# Patient Record
Sex: MALE | Race: WHITE | Employment: FULL TIME | URBAN - METROPOLITAN AREA
[De-identification: names, ages, dates, MRNs, and addresses within clinical notes are randomized per-mention and may not be internally consistent; named-entity substitution may affect disease eponyms.]

---

## 2019-09-23 ENCOUNTER — OFFICE VISIT (OUTPATIENT)
Dept: FAMILY MEDICINE CLINIC | Age: 34
End: 2019-09-23
Payer: COMMERCIAL

## 2019-09-23 VITALS
WEIGHT: 246.6 LBS | HEIGHT: 72 IN | HEART RATE: 79 BPM | DIASTOLIC BLOOD PRESSURE: 110 MMHG | OXYGEN SATURATION: 97 % | BODY MASS INDEX: 33.4 KG/M2 | SYSTOLIC BLOOD PRESSURE: 178 MMHG

## 2019-09-23 DIAGNOSIS — Z00.00 ENCOUNTER FOR MEDICAL EXAMINATION TO ESTABLISH CARE: Primary | ICD-10-CM

## 2019-09-23 DIAGNOSIS — I10 ESSENTIAL HYPERTENSION: ICD-10-CM

## 2019-09-23 DIAGNOSIS — R45.89 DEPRESSED MOOD: ICD-10-CM

## 2019-09-23 PROCEDURE — 99204 OFFICE O/P NEW MOD 45 MIN: CPT | Performed by: FAMILY MEDICINE

## 2019-09-23 RX ORDER — CHLORTHALIDONE 25 MG/1
25 TABLET ORAL DAILY
Qty: 30 TABLET | Refills: 3 | Status: SHIPPED | OUTPATIENT
Start: 2019-09-23 | End: 2019-10-31 | Stop reason: SDUPTHER

## 2019-09-23 ASSESSMENT — ENCOUNTER SYMPTOMS
PHOTOPHOBIA: 0
DIARRHEA: 0
COUGH: 0
VOMITING: 0
CONSTIPATION: 0
SHORTNESS OF BREATH: 0
BLOOD IN STOOL: 0
BACK PAIN: 0
TROUBLE SWALLOWING: 0
NAUSEA: 0
CHEST TIGHTNESS: 0
RHINORRHEA: 0
ABDOMINAL PAIN: 0
SORE THROAT: 0

## 2019-09-23 ASSESSMENT — PATIENT HEALTH QUESTIONNAIRE - PHQ9
SUM OF ALL RESPONSES TO PHQ9 QUESTIONS 1 & 2: 2
SUM OF ALL RESPONSES TO PHQ QUESTIONS 1-9: 2
2. FEELING DOWN, DEPRESSED OR HOPELESS: 0
SUM OF ALL RESPONSES TO PHQ QUESTIONS 1-9: 2
1. LITTLE INTEREST OR PLEASURE IN DOING THINGS: 2

## 2019-09-23 NOTE — PROGRESS NOTES
Substance and Sexual Activity   Sexual Activity Not on file          Patient's past medical history, surgical history, family history, medications,  andallergies  were all reviewed and updated as appropriate today. Review of Systems   Constitutional: Negative for chills, fatigue and fever. HENT: Negative for hearing loss, rhinorrhea, sore throat and trouble swallowing. Eyes: Negative for photophobia and visual disturbance. Respiratory: Negative for cough, chest tightness and shortness of breath. Cardiovascular: Negative for chest pain, palpitations and leg swelling. Gastrointestinal: Negative for abdominal pain, blood in stool, constipation, diarrhea, nausea and vomiting. Endocrine: Negative for polydipsia, polyphagia and polyuria. Genitourinary: Negative for difficulty urinating, dysuria and frequency. Musculoskeletal: Negative for arthralgias, back pain and myalgias. Skin: Negative for rash and wound. Neurological: Positive for headaches. Negative for dizziness, syncope, weakness, light-headedness and numbness. Hematological: Negative for adenopathy. Does not bruise/bleed easily. Psychiatric/Behavioral: Positive for dysphoric mood and sleep disturbance. Negative for behavioral problems, confusion, decreased concentration, hallucinations, self-injury and suicidal ideas. The patient is not nervous/anxious and is not hyperactive. Vitals:    09/23/19 0942   BP: (!) 178/110   Site: Left Upper Arm   Position: Sitting   Pulse: 79   SpO2: 97%   Weight: 246 lb 9.6 oz (111.9 kg)   Height: 5' 11.5\" (1.816 m)       Physical Exam   Constitutional: He is oriented to person, place, and time. He appears well-developed and well-nourished. No distress. HENT:   Head: Normocephalic and atraumatic.    Right Ear: Hearing, tympanic membrane, external ear and ear canal normal.   Left Ear: Hearing, tympanic membrane, external ear and ear canal normal.   Mouth/Throat: Oropharynx is clear and

## 2019-09-23 NOTE — PATIENT INSTRUCTIONS
It was nice meeting you today -- welcome to our office at [address] [place] [address].   We're happy to have you as a new patient    Start taking chlorthalidone once daily for blood pressure    Check your blood pressure at home and write down the numbers in a log to bring in to your next appointment    We'll get you set up with an appointment for therapy with our psychologist

## 2019-10-31 ENCOUNTER — OFFICE VISIT (OUTPATIENT)
Dept: FAMILY MEDICINE CLINIC | Age: 34
End: 2019-10-31
Payer: COMMERCIAL

## 2019-10-31 VITALS
OXYGEN SATURATION: 97 % | SYSTOLIC BLOOD PRESSURE: 158 MMHG | HEART RATE: 70 BPM | BODY MASS INDEX: 32.62 KG/M2 | WEIGHT: 237.2 LBS | DIASTOLIC BLOOD PRESSURE: 98 MMHG

## 2019-10-31 DIAGNOSIS — I10 ESSENTIAL HYPERTENSION: Primary | ICD-10-CM

## 2019-10-31 PROCEDURE — 99213 OFFICE O/P EST LOW 20 MIN: CPT | Performed by: FAMILY MEDICINE

## 2019-10-31 RX ORDER — CHLORTHALIDONE 50 MG/1
50 TABLET ORAL DAILY
Qty: 30 TABLET | Refills: 1 | Status: SHIPPED | OUTPATIENT
Start: 2019-10-31 | End: 2020-01-06

## 2019-10-31 SDOH — HEALTH STABILITY: PHYSICAL HEALTH: ON AVERAGE, HOW MANY DAYS PER WEEK DO YOU ENGAGE IN MODERATE TO STRENUOUS EXERCISE (LIKE A BRISK WALK)?: 4 DAYS

## 2019-10-31 SDOH — HEALTH STABILITY: PHYSICAL HEALTH: ON AVERAGE, HOW MANY MINUTES DO YOU ENGAGE IN EXERCISE AT THIS LEVEL?: 60 MIN

## 2019-10-31 ASSESSMENT — ENCOUNTER SYMPTOMS
VOMITING: 0
CONSTIPATION: 0
DIARRHEA: 0
SHORTNESS OF BREATH: 0
PHOTOPHOBIA: 0
CHEST TIGHTNESS: 0
COUGH: 0
BLURRED VISION: 0
ORTHOPNEA: 0
ABDOMINAL PAIN: 0
NAUSEA: 0

## 2020-01-06 RX ORDER — CHLORTHALIDONE 50 MG/1
TABLET ORAL
Qty: 30 TABLET | Refills: 1 | Status: SHIPPED | OUTPATIENT
Start: 2020-01-06 | End: 2020-02-04

## 2020-02-04 RX ORDER — CHLORTHALIDONE 50 MG/1
TABLET ORAL
Qty: 30 TABLET | Refills: 1 | Status: SHIPPED | OUTPATIENT
Start: 2020-02-04 | End: 2020-02-06 | Stop reason: SDUPTHER

## 2020-02-06 RX ORDER — CHLORTHALIDONE 50 MG/1
TABLET ORAL
Qty: 90 TABLET | Refills: 1 | Status: SHIPPED | OUTPATIENT
Start: 2020-02-06 | End: 2020-03-23 | Stop reason: SDUPTHER

## 2020-03-23 RX ORDER — CHLORTHALIDONE 50 MG/1
TABLET ORAL
Qty: 90 TABLET | Refills: 1 | Status: SHIPPED | OUTPATIENT
Start: 2020-03-23 | End: 2020-10-27 | Stop reason: SDUPTHER

## 2020-04-17 ENCOUNTER — NURSE TRIAGE (OUTPATIENT)
Dept: OTHER | Facility: CLINIC | Age: 35
End: 2020-04-17

## 2020-10-02 PROCEDURE — 90471 IMMUNIZATION ADMIN: CPT | Performed by: NURSE PRACTITIONER

## 2020-10-02 PROCEDURE — 90715 TDAP VACCINE 7 YRS/> IM: CPT | Performed by: NURSE PRACTITIONER

## 2020-10-21 ENCOUNTER — OFFICE VISIT (OUTPATIENT)
Dept: FAMILY MEDICINE CLINIC | Age: 35
End: 2020-10-21
Payer: COMMERCIAL

## 2020-10-21 VITALS
OXYGEN SATURATION: 97 % | BODY MASS INDEX: 35.85 KG/M2 | SYSTOLIC BLOOD PRESSURE: 142 MMHG | WEIGHT: 256.1 LBS | HEIGHT: 71 IN | HEART RATE: 74 BPM | DIASTOLIC BLOOD PRESSURE: 98 MMHG

## 2020-10-21 DIAGNOSIS — R73.09 ELEVATED GLUCOSE: ICD-10-CM

## 2020-10-21 DIAGNOSIS — I10 ESSENTIAL HYPERTENSION: ICD-10-CM

## 2020-10-21 LAB
ANION GAP SERPL CALCULATED.3IONS-SCNC: 9 MMOL/L (ref 3–16)
BUN BLDV-MCNC: 18 MG/DL (ref 7–20)
CALCIUM SERPL-MCNC: 9.4 MG/DL (ref 8.3–10.6)
CHLORIDE BLD-SCNC: 100 MMOL/L (ref 99–110)
CHOLESTEROL, TOTAL: 149 MG/DL (ref 0–199)
CO2: 31 MMOL/L (ref 21–32)
CREAT SERPL-MCNC: 1.2 MG/DL (ref 0.9–1.3)
GFR AFRICAN AMERICAN: >60
GFR NON-AFRICAN AMERICAN: >60
GLUCOSE BLD-MCNC: 81 MG/DL (ref 70–99)
HDLC SERPL-MCNC: 50 MG/DL (ref 40–60)
LDL CHOLESTEROL CALCULATED: 90 MG/DL
POTASSIUM SERPL-SCNC: 3.4 MMOL/L (ref 3.5–5.1)
SODIUM BLD-SCNC: 140 MMOL/L (ref 136–145)
TRIGL SERPL-MCNC: 46 MG/DL (ref 0–150)
VLDLC SERPL CALC-MCNC: 9 MG/DL

## 2020-10-21 PROCEDURE — 90686 IIV4 VACC NO PRSV 0.5 ML IM: CPT | Performed by: NURSE PRACTITIONER

## 2020-10-21 PROCEDURE — G0444 DEPRESSION SCREEN ANNUAL: HCPCS | Performed by: NURSE PRACTITIONER

## 2020-10-21 PROCEDURE — 90471 IMMUNIZATION ADMIN: CPT | Performed by: NURSE PRACTITIONER

## 2020-10-21 PROCEDURE — 99213 OFFICE O/P EST LOW 20 MIN: CPT | Performed by: NURSE PRACTITIONER

## 2020-10-21 RX ORDER — ESCITALOPRAM OXALATE 10 MG/1
TABLET ORAL
COMMUNITY
Start: 2020-10-05

## 2020-10-21 RX ORDER — LISINOPRIL 20 MG/1
20 TABLET ORAL DAILY
Qty: 90 TABLET | Refills: 1 | Status: SHIPPED | OUTPATIENT
Start: 2020-10-21 | End: 2021-04-25

## 2020-10-21 ASSESSMENT — PATIENT HEALTH QUESTIONNAIRE - PHQ9
4. FEELING TIRED OR HAVING LITTLE ENERGY: 2
1. LITTLE INTEREST OR PLEASURE IN DOING THINGS: 2
2. FEELING DOWN, DEPRESSED OR HOPELESS: 1
9. THOUGHTS THAT YOU WOULD BE BETTER OFF DEAD, OR OF HURTING YOURSELF: 0
SUM OF ALL RESPONSES TO PHQ QUESTIONS 1-9: 10
10. IF YOU CHECKED OFF ANY PROBLEMS, HOW DIFFICULT HAVE THESE PROBLEMS MADE IT FOR YOU TO DO YOUR WORK, TAKE CARE OF THINGS AT HOME, OR GET ALONG WITH OTHER PEOPLE: 1
6. FEELING BAD ABOUT YOURSELF - OR THAT YOU ARE A FAILURE OR HAVE LET YOURSELF OR YOUR FAMILY DOWN: 0
3. TROUBLE FALLING OR STAYING ASLEEP: 0
SUM OF ALL RESPONSES TO PHQ QUESTIONS 1-9: 10
7. TROUBLE CONCENTRATING ON THINGS, SUCH AS READING THE NEWSPAPER OR WATCHING TELEVISION: 2
5. POOR APPETITE OR OVEREATING: 3
8. MOVING OR SPEAKING SO SLOWLY THAT OTHER PEOPLE COULD HAVE NOTICED. OR THE OPPOSITE, BEING SO FIGETY OR RESTLESS THAT YOU HAVE BEEN MOVING AROUND A LOT MORE THAN USUAL: 0
SUM OF ALL RESPONSES TO PHQ9 QUESTIONS 1 & 2: 3
SUM OF ALL RESPONSES TO PHQ QUESTIONS 1-9: 10

## 2020-10-21 NOTE — PROGRESS NOTES
Subjective:      Patient ID: Lavern Donovan is a 38 y.o. male who presents for:   Chief Complaint   Patient presents with    Hypertension       Leif Courser presents for follow-up evaluation for hypertension. Currently only taking chlorthalidone. Does not do home blood pressure checks. Denies any chest pain, dizziness, shortness of breath, edema. Review of Systems   All other systems reviewed and are negative. Past Medical History:   Diagnosis Date    Hypertension      No past surgical history on file. Social History     Social History Narrative    Works at plastics     3 kids (15, 8, 6)    Hobbies: basketball, photography, video games     Family History   Problem Relation Age of Onset    Diabetes Mother     Diabetes Maternal Grandmother     Diabetes Paternal Grandmother      Social History     Tobacco Use    Smoking status: Never Smoker    Smokeless tobacco: Never Used   Substance Use Topics    Alcohol use: No     No Known Allergies  Current Outpatient Medications   Medication Sig Dispense Refill    escitalopram (LEXAPRO) 10 MG tablet       lisinopril (PRINIVIL;ZESTRIL) 20 MG tablet Take 1 tablet by mouth daily 90 tablet 1    chlorthalidone (HYGROTEN) 50 MG tablet TAKE 1 TABLET BY MOUTH EVERY DAY 90 tablet 1     No current facility-administered medications for this visit. Patient's past medical history, surgical history, family history, medications,  andallergies  were all reviewed and updated as appropriate today. Objective:     Vitals:    10/21/20 1449   BP: (!) 142/98   Pulse:    SpO2:      Physical Exam  Constitutional:       Appearance: He is well-developed. HENT:      Head: Normocephalic. Eyes:      Pupils: Pupils are equal, round, and reactive to light. Neck:      Musculoskeletal: Normal range of motion. Cardiovascular:      Rate and Rhythm: Normal rate and regular rhythm. Heart sounds: Normal heart sounds.    Pulmonary:      Effort: Pulmonary effort is normal. Breath sounds: Normal breath sounds. Musculoskeletal: Normal range of motion. Skin:     General: Skin is warm and dry. Neurological:      Mental Status: He is alert and oriented to person, place, and time. Assessment      Encounter Diagnoses   Name Primary?  Essential hypertension Yes    Elevated glucose        PLAN:  Health Maintenance   Topic Date Due    Potassium monitoring  1984    Creatinine monitoring  1984    DTaP/Tdap/Td vaccine (1 - Tdap) 07/06/2003    Flu vaccine (1) 09/01/2020    HIV screen  Completed    Hepatitis A vaccine  Aged Out    Hepatitis B vaccine  Aged Out    Hib vaccine  Aged Out    Meningococcal (ACWY) vaccine  Aged Out    Pneumococcal 0-64 years Vaccine  Aged Out    Varicella vaccine  Discontinued      Diagnosis Orders   1. Essential hypertension  BASIC METABOLIC PANEL    lisinopril (PRINIVIL;ZESTRIL) 20 MG tablet    LIPID PANEL   2. Elevated glucose  HEMOGLOBIN A1C         Essential hypertension  Still not well controlled on chlorthalidone alone. Will add in lisinopril 20 mg daily. Recommended he get home blood pressure monitoring cuff and connect with me via [employer] with his readings. I would like to see him back in the office in 2 weeks for BP check. Labs to be drawn today    Elevated glucose  Adia Ohs history of diabetes we will check A1c today    No follow-ups on file.     Nhan Vega, APRN - CNP  [facility]  10/22/2020

## 2020-10-22 PROBLEM — R73.09 ELEVATED GLUCOSE: Status: ACTIVE | Noted: 2020-10-22

## 2020-10-22 PROBLEM — I10 ESSENTIAL HYPERTENSION: Status: ACTIVE | Noted: 2020-10-22

## 2020-10-22 LAB
ESTIMATED AVERAGE GLUCOSE: 116.9 MG/DL
HBA1C MFR BLD: 5.7 %

## 2020-10-22 NOTE — ASSESSMENT & PLAN NOTE
Still not well controlled on chlorthalidone alone. Will add in lisinopril 20 mg daily. Recommended he get home blood pressure monitoring cuff and connect with me via [employer] with his readings. I would like to see him back in the office in 2 weeks for BP check.   Labs to be drawn today

## 2020-10-27 RX ORDER — CHLORTHALIDONE 50 MG/1
TABLET ORAL
Qty: 90 TABLET | Refills: 1 | Status: SHIPPED | OUTPATIENT
Start: 2020-10-27 | End: 2021-04-29

## 2020-12-18 ENCOUNTER — HOSPITAL ENCOUNTER (EMERGENCY)
Age: 35
Discharge: HOME | End: 2020-12-18
Attending: EMERGENCY MEDICINE
Payer: COMMERCIAL

## 2020-12-18 ENCOUNTER — APPOINTMENT (OUTPATIENT)
Dept: GENERAL RADIOLOGY | Age: 35
End: 2020-12-18
Payer: COMMERCIAL

## 2020-12-18 VITALS
OXYGEN SATURATION: 97 % | SYSTOLIC BLOOD PRESSURE: 147 MMHG | DIASTOLIC BLOOD PRESSURE: 82 MMHG | TEMPERATURE: 98.1 F | HEIGHT: 72 IN | WEIGHT: 255 LBS | BODY MASS INDEX: 34.54 KG/M2 | HEART RATE: 74 BPM | RESPIRATION RATE: 15 BRPM

## 2020-12-18 LAB
ALBUMIN SERPL-MCNC: 4.5 G/DL (ref 3.4–5)
ALP BLD-CCNC: 55 U/L (ref 40–129)
ALT SERPL-CCNC: 24 U/L (ref 10–40)
ANION GAP SERPL CALCULATED.3IONS-SCNC: 10 MMOL/L (ref 3–16)
AST SERPL-CCNC: 25 U/L (ref 15–37)
BASOPHILS ABSOLUTE: 0.1 K/UL (ref 0–0.2)
BASOPHILS RELATIVE PERCENT: 1.2 %
BILIRUB SERPL-MCNC: 0.4 MG/DL (ref 0–1)
BILIRUBIN DIRECT: <0.2 MG/DL (ref 0–0.3)
BILIRUBIN, INDIRECT: NORMAL MG/DL (ref 0–1)
BUN BLDV-MCNC: 18 MG/DL (ref 7–20)
CALCIUM SERPL-MCNC: 9.6 MG/DL (ref 8.3–10.6)
CHLORIDE BLD-SCNC: 98 MMOL/L (ref 99–110)
CO2: 31 MMOL/L (ref 21–32)
CREAT SERPL-MCNC: 1.2 MG/DL (ref 0.9–1.3)
D DIMER: <200 NG/ML DDU (ref 0–229)
EKG ATRIAL RATE: 69 BPM
EKG DIAGNOSIS: NORMAL
EKG P AXIS: 44 DEGREES
EKG P-R INTERVAL: 176 MS
EKG Q-T INTERVAL: 388 MS
EKG QRS DURATION: 92 MS
EKG QTC CALCULATION (BAZETT): 415 MS
EKG R AXIS: 47 DEGREES
EKG T AXIS: 27 DEGREES
EKG VENTRICULAR RATE: 69 BPM
EOSINOPHILS ABSOLUTE: 0.2 K/UL (ref 0–0.6)
EOSINOPHILS RELATIVE PERCENT: 1.7 %
GFR AFRICAN AMERICAN: >60
GFR NON-AFRICAN AMERICAN: >60
GLUCOSE BLD-MCNC: 107 MG/DL (ref 70–99)
HCT VFR BLD CALC: 46.9 % (ref 40.5–52.5)
HEMOGLOBIN: 15.7 G/DL (ref 13.5–17.5)
LIPASE: 25 U/L (ref 13–60)
LYMPHOCYTES ABSOLUTE: 3.8 K/UL (ref 1–5.1)
LYMPHOCYTES RELATIVE PERCENT: 38.5 %
MCH RBC QN AUTO: 24.6 PG (ref 26–34)
MCHC RBC AUTO-ENTMCNC: 33.6 G/DL (ref 31–36)
MCV RBC AUTO: 73.2 FL (ref 80–100)
MONOCYTES ABSOLUTE: 0.7 K/UL (ref 0–1.3)
MONOCYTES RELATIVE PERCENT: 7.1 %
NEUTROPHILS ABSOLUTE: 5 K/UL (ref 1.7–7.7)
NEUTROPHILS RELATIVE PERCENT: 51.5 %
PDW BLD-RTO: 13.9 % (ref 12.4–15.4)
PLATELET # BLD: 212 K/UL (ref 135–450)
PMV BLD AUTO: 8.4 FL (ref 5–10.5)
POTASSIUM SERPL-SCNC: 3.3 MMOL/L (ref 3.5–5.1)
RBC # BLD: 6.4 M/UL (ref 4.2–5.9)
SODIUM BLD-SCNC: 139 MMOL/L (ref 136–145)
TOTAL PROTEIN: 7.7 G/DL (ref 6.4–8.2)
TROPONIN: <0.01 NG/ML
WBC # BLD: 9.7 K/UL (ref 4–11)

## 2020-12-18 PROCEDURE — 36415 COLL VENOUS BLD VENIPUNCTURE: CPT

## 2020-12-18 PROCEDURE — 83690 ASSAY OF LIPASE: CPT

## 2020-12-18 PROCEDURE — 85379 FIBRIN DEGRADATION QUANT: CPT

## 2020-12-18 PROCEDURE — 80076 HEPATIC FUNCTION PANEL: CPT

## 2020-12-18 PROCEDURE — 6370000000 HC RX 637 (ALT 250 FOR IP): Performed by: PHYSICIAN ASSISTANT

## 2020-12-18 PROCEDURE — 71046 X-RAY EXAM CHEST 2 VIEWS: CPT

## 2020-12-18 PROCEDURE — 85025 COMPLETE CBC W/AUTO DIFF WBC: CPT

## 2020-12-18 PROCEDURE — 93010 ELECTROCARDIOGRAM REPORT: CPT | Performed by: INTERNAL MEDICINE

## 2020-12-18 PROCEDURE — 84484 ASSAY OF TROPONIN QUANT: CPT

## 2020-12-18 PROCEDURE — U0003 INFECTIOUS AGENT DETECTION BY NUCLEIC ACID (DNA OR RNA); SEVERE ACUTE RESPIRATORY SYNDROME CORONAVIRUS 2 (SARS-COV-2) (CORONAVIRUS DISEASE [COVID-19]), AMPLIFIED PROBE TECHNIQUE, MAKING USE OF HIGH THROUGHPUT TECHNOLOGIES AS DESCRIBED BY CMS-2020-01-R: HCPCS

## 2020-12-18 PROCEDURE — 93005 ELECTROCARDIOGRAM TRACING: CPT | Performed by: EMERGENCY MEDICINE

## 2020-12-18 PROCEDURE — 99284 EMERGENCY DEPT VISIT MOD MDM: CPT

## 2020-12-18 PROCEDURE — 80048 BASIC METABOLIC PNL TOTAL CA: CPT

## 2020-12-18 RX ORDER — SUCRALFATE ORAL 1 G/10ML
1 SUSPENSION ORAL 4 TIMES DAILY
Qty: 1200 ML | Refills: 3 | Status: SHIPPED | OUTPATIENT
Start: 2020-12-18

## 2020-12-18 RX ORDER — PANTOPRAZOLE SODIUM 20 MG/1
40 TABLET, DELAYED RELEASE ORAL DAILY
Qty: 30 TABLET | Refills: 0 | Status: SHIPPED | OUTPATIENT
Start: 2020-12-18 | End: 2021-02-25

## 2020-12-18 RX ORDER — ONDANSETRON 4 MG/1
4 TABLET, ORALLY DISINTEGRATING ORAL EVERY 8 HOURS PRN
Qty: 20 TABLET | Refills: 0 | Status: SHIPPED | OUTPATIENT
Start: 2020-12-18 | End: 2021-02-25

## 2020-12-18 RX ADMIN — LIDOCAINE HYDROCHLORIDE: 20 SOLUTION ORAL; TOPICAL at 12:22

## 2020-12-18 ASSESSMENT — ENCOUNTER SYMPTOMS
CONSTIPATION: 0
STRIDOR: 0
BACK PAIN: 0
COUGH: 1
NAUSEA: 0
DIARRHEA: 0
SHORTNESS OF BREATH: 0
ABDOMINAL PAIN: 1
VOMITING: 0
WHEEZING: 0
CHEST TIGHTNESS: 0
COLOR CHANGE: 0

## 2020-12-18 ASSESSMENT — PAIN SCALES - GENERAL
PAINLEVEL_OUTOF10: 7
PAINLEVEL_OUTOF10: 3

## 2020-12-18 NOTE — ED NOTES
Pt reports he is feeling better after GI cocktail. No s/s of distress noted.       Manas Sadler RN  12/18/20 6620

## 2020-12-18 NOTE — ED PROVIDER NOTES
[address] [facility]        Pt Name: Jason Contreras  MRN: [id number]  [place] 1985  Date of evaluation: 12/18/2020  Provider: JEFFREY Choudhury  PCP: DONY Rivera CNP     I have seen and evaluated this patient with my supervising physician [facility]       Chief Complaint   Patient presents with    Chest Pain     Patient reports midepigastric pain, feels like indigestion but states it got really bad last night. States pain is intermittent, but is the worst when he lays down. Also reports cough x3 weeks. HISTORY OF PRESENT ILLNESS   (Location, Timing/Onset, Context/Setting, Quality, Duration, Modifying Factors, Severity, Associated Signs and Symptoms)  Note limiting factors. Symone Arizmendi is a 38 y.o. male with past medical history of hypertension who presents to the ED with complaint of epigastric abdominal pain that rates into his chest.  Patient states he feels like he has indigestion. States symptoms been ongoing for the past 2 days. Is worsened at night when he lies flat. Patient last night was the worst.  Has been taking some antacids with minimal improvement of symptoms. Patient denies any significant history of reflux in the past.  Patient states he has had burning/aching pain rated 7/10 to his epigastric abdomen that radiates into his chest.  Denies any aggravating alleviating factors. States is not any worse with eating. States he is also has nonproductive cough for the past 3 days. Has been taking some over-the-counter cough medication. States there have been some coworkers who have been diagnosed with COVID-19. Denies any other sick contacts. Patient states he did have recent travel to [place] back in November. Patient denies history of DVT or PE but states he does have a slight pleuritic component to the pain. Denies any hemoptysis, orthopnea, pedal edema or calf tenderness. Denies fever chills. Denies rashes or lesions. Denies significant shortness of breath. Denies headache, lightheadedness/dizziness or becoming diaphoretic. Denies urinary symptoms or changes in bowel movements. Denies any surgeries to the abdomen in the past.  Patient states he does smoke marijuana occasionally but denies any other smoking status. Denies significant alcohol usage. Denies significant NSAID usage. Nursing Notes were all reviewed and agreed with or any disagreements were addressed in the HPI. REVIEW OF SYSTEMS    (2-9 systems for level 4, 10 or more for level 5)     Review of Systems   Constitutional: Negative for activity change, appetite change, chills, diaphoresis, fatigue and fever. Respiratory: Positive for cough. Negative for chest tightness, shortness of breath, wheezing and stridor. Cardiovascular: Positive for chest pain. Negative for palpitations and leg swelling. Gastrointestinal: Positive for abdominal pain. Negative for constipation, diarrhea, nausea and vomiting. Genitourinary: Negative for decreased urine volume, difficulty urinating, dysuria, flank pain, frequency, hematuria and urgency. Musculoskeletal: Negative for arthralgias, back pain, myalgias, neck pain and neck stiffness. Skin: Negative for color change, pallor, rash and wound. Neurological: Negative for dizziness, light-headedness and headaches. Positives and Pertinent negatives as per HPI. Except as noted above in the ROS, all other systems were reviewed and negative. PAST MEDICAL HISTORY     Past Medical History:   Diagnosis Date    Hypertension          SURGICAL HISTORY   History reviewed. No pertinent surgical history. [address]       Discharge Medication List as of 12/18/2020  3:21 PM      CONTINUE these medications which have NOT CHANGED    Details   chlorthalidone (HYGROTEN) 50 MG tablet TAKE 1 TABLET BY MOUTH EVERY DAY, Disp-90 tablet, R-1Normal      escitalopram (LEXAPRO) 10 MG tablet Historical Med      lisinopril (PRINIVIL;ZESTRIL) 20 MG tablet Take 1 tablet by mouth daily, Disp-90 tablet,R-1Normal               ALLERGIES     Patient has no known allergies.     FAMILYHISTORY       Family History   Problem Relation Age of Onset    Diabetes Mother     Diabetes Maternal Grandmother     Diabetes Paternal Grandmother           SOCIAL HISTORY       Social History     Tobacco Use    Smoking status: Never Smoker    Smokeless tobacco: Never Used   Substance Use Topics    Alcohol use: No    Drug use: Yes     Types: Marijuana       SCREENINGS             PHYSICAL EXAM    (up to 7 for level 4, 8 or more for level 5)     ED Triage Vitals [12/18/20 1048] Potassium 3.3 (*)     Chloride 98 (*)     Glucose 107 (*)     All other components within normal limits    Narrative:     Performed at:  [facility] [address], [place]   Phone [phone]   CBC WITH AUTO DIFFERENTIAL - Abnormal; Notable for the following components:    RBC 6.40 (*)     MCV 73.2 (*)     MCH 24.6 (*)     All other components within normal limits    Narrative:     Performed at:  [facility] [address], [place]   Phone [phone]   HEPATIC FUNCTION PANEL    Narrative:     Performed at:  [facility] [address], [place]   Phone [phone]   LIPASE    Narrative:     Performed at:  [facility] [address], [place]   Phone [phone]   TROPONIN    Narrative:     Performed at:  [facility] [address]   Phone [phone]   D-DIMER, QUANTITATIVE    Narrative:     Performed at:  [facility] [address]   Phone [phone]       All other labs were within normal range or not returned as of this dictation. EKG: All EKG's are interpreted by the Emergency Department Physician in the absence of a cardiologist.  Please see their note for interpretation of EKG. RADIOLOGY:   Non-plain film images such as CT, Ultrasound and MRI are read by the radiologist. Plain radiographic images are visualized and preliminarily interpreted by the ED Provider with the below findings:        Interpretation per the Radiologist below, if available at the time of this note:    XR CHEST (2 VW)   Final Result   1. No acute abnormality. No results found.         PROCEDURES   Unless otherwise noted below, none     Procedures    CRITICAL CARE TIME   N/A    CONSULTS:  None EMERGENCY DEPARTMENT COURSE and DIFFERENTIAL DIAGNOSIS/MDM:   Vitals:    Vitals:    12/18/20 1300 12/18/20 1315 12/18/20 1330 12/18/20 1345   BP: 137/86 (!) 140/89 (!) 152/78 (!) 147/82   Pulse:       Resp:       Temp:       TempSrc:       SpO2: 96% 96% 96% 97%   Weight:       Height:           Patient was given the following medications:  Medications   aluminum & magnesium hydroxide-simethicone (MAALOX) 30 mL, lidocaine viscous hcl (XYLOCAINE) 5 mL (GI COCKTAIL) ( Oral Given 12/18/20 1222) Patient is a 70-year-old male who presents to the ED with chronic abdominal pain. Pain epigastric abdominal pain that radiates into his chest.  Patient states he does have some slight pleuritic discomfort. Did have recent travel to [place]. Patient has reassuring vital signs here in ED. D-dimer obtained and was negative. Low concern for PE, D-dimer do not believe CT of the chest indicated at this time. BMP showed potassium 3.3 but otherwise unremarkable. CBC showed normal white count, hemoglobin and platelets. Normal lymphocytes. Hepatic function, lipase and troponin unremarkable. EKG interval attending. Chest x-ray unremarkable. Patient suffering from epigastric abdominal comfort with concern for potential reflux. Patient states worsened at night. Patient dates he also had a cough which may potentially be due to reflux. Will treat with GI cocktail here in the ED. Patient had improvement of symptoms with GI cocktail. Patient be discharged home with Protonix, Zofran and Carafate. Follow-up with PCP. Return ED if any worsening symptoms. Patient has had cough and is concerned for potential COVID-19. Has had positive COVID-19 contact. Low suspicion clinically for COVID-19 but Covid swab obtained here in the ED. Patient strictly self quarantine pending Covid results. Low suspicion for ACS, PE, dissection, AAA, pneumonia, pneumothorax respiratory stress, anxiety, CHF, COPD, asthma, surgical abdomen or other emergent etiology at this time. FINAL IMPRESSION      1. Abdominal discomfort, epigastric    2. Person under investigation for COVID-19          DISPOSITION/PLAN   DISPOSITION Decision To Discharge 12/18/2020 03:18:27 PM      PATIENT REFERREDTO:  Niki Connor, DONY - CNP  [address]  [address]  [phone]    Schedule an appointment as soon as possible for a visit   For a Re-check in  3-5    days.     [facility] Emergency Department  [address]  [phone]  Go to   As needed, If symptoms worsen      DISCHARGE MEDICATIONS:  Discharge Medication List as of 12/18/2020  3:21 PM      START taking these medications    Details   pantoprazole (PROTONIX) 20 MG tablet Take 2 tablets by mouth daily, Disp-30 tablet, R-0Print      ondansetron (ZOFRAN ODT) 4 MG disintegrating tablet Take 1 tablet by mouth every 8 hours as needed for Nausea, Disp-20 tablet, R-0Print      sucralfate (CARAFATE) 1 GM/10ML suspension Take 10 mLs by mouth 4 times daily, Disp-1200 mL, R-3Print             DISCONTINUED MEDICATIONS:  Discharge Medication List as of 12/18/2020  3:21 PM                 (Please note that portions of this note were completed with a voice recognition program.  Efforts were made to edit the dictations but occasionally words are mis-transcribed.)    JEFFERY Albert (electronically signed)          JEFFERY Belle  12/18/20 2028

## 2020-12-18 NOTE — ED NOTES
Pt updated on plan for discharge soon. No s/s of distress noted. Skin warm dry and pink.        Manas Sadler RN  12/18/20 9627

## 2020-12-18 NOTE — ED NOTES
Pt c/o chest pain with sob x 2 days. Pt states around covid + co workers. Only complaint is sob with chest pain. Resp easy non labored. Skin warm dry and pink. Pt updated on plan of care. GI cocktail given. BP elevated. Will cont to monitor.       Evan JAMIA Naranjo  12/18/20 2026

## 2020-12-18 NOTE — ED NOTES
Bed: 18  Expected date:   Expected time:   Means of arrival:   Comments:  Monty Pires [facility]  12/18/20 1142

## 2020-12-18 NOTE — DISCHARGE INSTR - COC
Continuity of Care Form    Patient Name: Winnie Joyner   :  1985  MRN:  [id number]    Admit date:  2020  Discharge date:  ***    Code Status Order: No Order   Advance Directives:     Admitting Physician:  No admitting provider for patient encounter. PCP: DONY Jackson CNP    Discharging Nurse: [facility] Unit/Room#: ED-0018/18  Discharging Unit Phone Number: ***    Emergency Contact:   Extended Emergency Contact Information  Primary Emergency Contact: [address] Phone: [phone]  Relation: Other  Secondary Emergency Contact: [address] Phone: [phone]  Relation: Brother/Sister    Past Surgical History:  History reviewed. No pertinent surgical history.     Immunization History:   Immunization History   Administered Date(s) Administered    Influenza Virus Vaccine 10/01/2018, 2019    Influenza, MDCK Quadv, IM, PF (Flucelvax 4 yrs and older) 2019    Influenza, Koleen Santiago, IM, PF (6 mo and older Fluzone, Flulaval, Fluarix, and 3 yrs and older Afluria) 10/21/2020    Td, unspecified formulation 2007, 2010    Tdap (Boostrix, Adacel) 10/02/2020       Active Problems:  Patient Active Problem List   Diagnosis Code    Elevated glucose R73.09    Essential hypertension I10       Isolation/Infection:   Isolation          No Isolation        Patient Infection Status     Infection Onset Added Last Indicated Last Indicated By Review Planned Expiration Resolved Resolved By    COVID-19 Rule Out 20 COVID-19 (Ordered) 20            Nurse Assessment:  Last Vital Signs: BP (!) 149/89   Pulse 80   Temp 98.1 °F (36.7 °C) (Infrared)   Resp 16   Ht 6' (1.829 m)   Wt 255 lb (115.7 kg)   SpO2 96%   BMI 34.58 kg/m²     Last documented pain score (0-10 scale): Pain Level: 7  Last Weight:   Wt Readings from Last 1 Encounters:   20 255 lb (115.7 kg)     Mental Status:  {IP PT MENTAL STATUS:} IV Access:  { JASPER IV ACCESS:621018943}    Nursing Mobility/ADLs:  Walking   {CHP DME QNPN:362908597}  Transfer  {CHP DME THFA:483866730}  Bathing  {CHP DME ZZLZ:445330204}  Dressing  {CHP DME ZMAX:659032977}  Toileting  {CHP DME ECWY:019689075}  Feeding  {CHP DME EUZJ:782928889}  Med Admin  {CHP DME ZLNN:902098925}  Med Delivery   { JASPER MED Delivery:502650256}    Wound Care Documentation and Therapy:        Elimination:  Continence:   · Bowel: {YES / ED:96897}  · Bladder: {YES / IZ:03991}  Urinary Catheter: {Urinary Catheter:627413499}   Colostomy/Ileostomy/Ileal Conduit: {YES / MA:12667}       Date of Last BM: ***  No intake or output data in the 24 hours ending 20 1157  No intake/output data recorded.     Safety Concerns:     [employer] Safety Concerns:290119221}    Impairments/Disabilities:      [employer] Impairments/Disabilities:951521635}    Nutrition Therapy:  Current Nutrition Therapy:   [employer] Diet List:478804074}    Routes of Feeding: {[facility] DME Other Feedings:710414474}  Liquids: {Slp liquid thickness:31203}  Daily Fluid Restriction: {CHP DME Yes amt example:308952983}  Last Modified Barium Swallow with Video (Video Swallowing Test): {Done Not Done PTDZ:278368609}    Treatments at the Time of Hospital Discharge:   Respiratory Treatments: ***  Oxygen Therapy:  {Therapy; copd oxygen:45891}  Ventilator:    { CC Vent IBJZ:702749046}    Rehab Therapies: {THERAPEUTIC INTERVENTION:0150456236}  Weight Bearing Status/Restrictions: [employer] Weight Bearin}  Other Medical Equipment (for information only, NOT a DME order):  {EQUIPMENT:716898760}  Other Treatments: ***    Patient's personal belongings (please select all that are sent with patient):  {[facility] DME Belongings:726710753}    RN SIGNATURE:  {Esignature:135642228}    CASE MANAGEMENT/SOCIAL WORK SECTION    Inpatient Status Date: ***    Readmission Risk Assessment Score:  Readmission Risk              Risk of Unplanned Readmission:        0 Discharging to Facility/ Agency   · Name:   · Address:  · Phone:  · Fax:    Dialysis Facility (if applicable)   · Name:  · Address:  · Dialysis Schedule:  · Phone:  · Fax:    / signature: {Esignature:813209429}    PHYSICIAN SECTION    Prognosis: {Prognosis:0214174062}    Condition at Discharge: Lan Mcclendon Patient Condition:987104407}    Rehab Potential (if transferring to Rehab): {Prognosis:8554358706}    Recommended Labs or Other Treatments After Discharge: ***    Physician Certification: I certify the above information and transfer of Caesar Ornelas  is necessary for the continuing treatment of the diagnosis listed and that he requires {Admit to Appropriate Level of Care:36126} for {GREATER/LESS:206183906} 30 days.      Update Admission H&P: {CHP DME Changes in [facility]:746191462}    PHYSICIAN SIGNATURE:  {Esignature:930395115}

## 2020-12-18 NOTE — ED PROVIDER NOTES
I independently performed a history and physical on [facility]. All diagnostic, treatment, and disposition decisions were made by myself in conjunction with the advanced practice provider. Briefly, this is a 38 y.o. male here for episode of burning epigastric abdominal pain intermittent for the past couple of days that feels better when he sits up straight. No nausea vomiting diarrhea. On exam, WDWN M, NAD, Heart RRR, Lungs CTAB, no r/r/w. Abdomen TTP to epigastrium    EKG  Reviewed by myself. Dated today at 18. Rate 69. Sinus rhythm normal EKG. No change prior. Screenings                   MDM  Reviwed workup; benign  Gestalt for gastritis  DC on PPI    Patient Referrals:  Tiffanie Ulrich, APRN - CNP  [facility]  [address] [facility]  [phone]    Schedule an appointment as soon as possible for a visit   For a Re-check in  3-5    days. [facility] Emergency Department  [address] [facility]  [address] [address]  [phone]  Go to   As needed, If symptoms worsen      Discharge Medications:  Discharge Medication List as of 12/18/2020  3:21 PM      START taking these medications    Details   pantoprazole (PROTONIX) 20 MG tablet Take 2 tablets by mouth daily, Disp-30 tablet, R-0Print      ondansetron (ZOFRAN ODT) 4 MG disintegrating tablet Take 1 tablet by mouth every 8 hours as needed for Nausea, Disp-20 tablet, R-0Print      sucralfate (CARAFATE) 1 GM/10ML suspension Take 10 mLs by mouth 4 times daily, Disp-1200 mL, R-3Print             FINAL IMPRESSION  1. Abdominal discomfort, epigastric    2. Person under investigation for COVID-19        Blood pressure (!) 147/82, pulse 74, temperature 98.1 °F (36.7 °C), temperature source Infrared, resp. rate 15, height 6' (1.829 m), weight 255 lb (115.7 kg), SpO2 97 %. For further details of [facility] emergency department encounter, please see documentation by advanced practice provider, Sergio.

## 2020-12-19 ENCOUNTER — CARE COORDINATION (OUTPATIENT)
Dept: CARE COORDINATION | Age: 35
End: 2020-12-19

## 2020-12-19 LAB — SARS-COV-2: NOT DETECTED

## 2020-12-19 NOTE — CARE COORDINATION
Patient contacted regarding [facility]. Discussed COVID-19 related testing which was available at this time. Test results were negative. Patient informed of results, if available? Yes    Care Transition Nurse/ Ambulatory Care Manager contacted the patient by telephone to perform post discharge assessment. Call within 2 business days of discharge: Yes. Verified name and  with patient as identifiers. Provided introduction to self, and explanation of the CTN/ACM role, and reason for call due to risk factors for infection and/or exposure to COVID-19. Symptoms reviewed with patient who verbalized the following symptoms: chest congestion. Due to no new or worsening symptoms encounter was not routed to provider for escalation. Discussed follow-up appointments. If no appointment was previously scheduled, appointment scheduling offered: Yes  [facility] follow up appointment(s): No future appointments. Non-[facility] follow up appointment(s):     Pt to call PCP on Monday, taking MUccinex for chest congestion  Encouraged fluids    Non-face-to-face services provided:  Obtained and reviewed discharge summary and/or continuity of care documents  Reviewed and followed up on pending diagnostic tests and treatments-covid  Education of patient/family/caregiver/guardian to support self-management-cdc guidelines     Advance Care Planning:   Does patient have an Advance Directive:  not on file. Patient has following risk factors of: no known risk factors. CTN/ACM reviewed discharge instructions, medical action plan and red flags such as increased shortness of breath, increasing fever and signs of decompensation with patient who verbalized understanding. Discussed exposure protocols and quarantine with CDC Guidelines What to do if you are sick with coronavirus disease .  Patient was given an opportunity for questions and concerns.  The patient agrees to contact the [facility] exposure line [phone], [facility] [address] Department of [facility]: ([phone]) and PCP office for questions related to their healthcare. CTN/ACM provided contact information for future needs. Reviewed and educated patient on any new and changed medications related to discharge diagnosis     Patient/family/caregiver given information for [facility] Loop and agrees to enroll no  Patient's preferred e-mail:    Patient's preferred phone number:   Based on Loop alert triggers, patient will be contacted by nurse care manager for worsening symptoms. No further outreach planned based on severity of symptoms and risk factors.

## 2021-02-25 ENCOUNTER — OFFICE VISIT (OUTPATIENT)
Dept: FAMILY MEDICINE CLINIC | Age: 36
End: 2021-02-25
Payer: COMMERCIAL

## 2021-02-25 VITALS
DIASTOLIC BLOOD PRESSURE: 80 MMHG | WEIGHT: 261 LBS | HEART RATE: 74 BPM | SYSTOLIC BLOOD PRESSURE: 114 MMHG | HEIGHT: 72 IN | BODY MASS INDEX: 35.35 KG/M2 | OXYGEN SATURATION: 98 % | TEMPERATURE: 97.8 F

## 2021-02-25 DIAGNOSIS — I10 ESSENTIAL HYPERTENSION: Primary | ICD-10-CM

## 2021-02-25 PROCEDURE — 99214 OFFICE O/P EST MOD 30 MIN: CPT | Performed by: NURSE PRACTITIONER

## 2021-02-25 RX ORDER — AMLODIPINE BESYLATE 10 MG/1
10 TABLET ORAL DAILY
Qty: 30 TABLET | Refills: 3 | Status: SHIPPED | OUTPATIENT
Start: 2021-02-25 | End: 2021-06-18

## 2021-02-25 ASSESSMENT — PATIENT HEALTH QUESTIONNAIRE - PHQ9
1. LITTLE INTEREST OR PLEASURE IN DOING THINGS: 0
SUM OF ALL RESPONSES TO PHQ QUESTIONS 1-9: 0
SUM OF ALL RESPONSES TO PHQ QUESTIONS 1-9: 0

## 2021-02-25 NOTE — PROGRESS NOTES
Mata Jacques (:  1985) is a 38 y.o. male,Established patient, here for evaluation of the following chief complaint(s):  Hypertension and Cough (at night )      ASSESSMENT/PLAN:  1. Essential hypertension  Assessment & Plan:  Blood pressure well controlled on lisinopril 20 mg daily in addition to chlorthalidone 50 mg. Patient reports chronic and irritating dry cough ever since starting the medications. We will stop the lisinopril and switch to Norvasc 10 mg. He will continue to check his pressures and follow-up with me in 2 weeks      No follow-ups on file. SUBJECTIVE/OBJECTIVE:  Patient here for follow-up on blood pressure  Chronic cough since start of medication 3 months ago  Other complaints  Swelling      Current Outpatient Medications   Medication Sig Dispense Refill    amLODIPine (NORVASC) 10 MG tablet Take 1 tablet by mouth daily 30 tablet 3    sucralfate (CARAFATE) 1 GM/10ML suspension Take 10 mLs by mouth 4 times daily 1200 mL 3    chlorthalidone (HYGROTEN) 50 MG tablet TAKE 1 TABLET BY MOUTH EVERY DAY 90 tablet 1    escitalopram (LEXAPRO) 10 MG tablet       lisinopril (PRINIVIL;ZESTRIL) 20 MG tablet Take 1 tablet by mouth daily 90 tablet 1     No current facility-administered medications for this visit. Review of Systems   Respiratory: Positive for cough. All other systems reviewed and are negative. Vitals:    21 1517   BP: 114/80   Site: Left Upper Arm   Position: Sitting   Cuff Size: Large Adult   Pulse: 74   Temp: 97.8 °F (36.6 °C)   SpO2: 98%   Weight: 261 lb (118.4 kg)   Height: 6' (1.829 m)       Physical Exam  Constitutional:       Appearance: He is well-developed. HENT:      Head: Normocephalic. Eyes:      Pupils: Pupils are equal, round, and reactive to light. Neck:      Musculoskeletal: Normal range of motion. Cardiovascular:      Rate and Rhythm: Normal rate and regular rhythm. Heart sounds: Normal heart sounds.    Pulmonary: Effort: Pulmonary effort is normal.      Breath sounds: Normal breath sounds. Musculoskeletal: Normal range of motion. Skin:     General: Skin is warm and dry. Neurological:      Mental Status: He is alert and oriented to person, place, and time. An electronic signature was used to authenticate this note.     --DONY Sher - CNP

## 2021-03-01 ASSESSMENT — ENCOUNTER SYMPTOMS: COUGH: 1

## 2021-03-01 NOTE — ASSESSMENT & PLAN NOTE
Blood pressure well controlled on lisinopril 20 mg daily in addition to chlorthalidone 50 mg. Patient reports chronic and irritating dry cough ever since starting the medications. We will stop the lisinopril and switch to Norvasc 10 mg.   He will continue to check his pressures and follow-up with me in 2 weeks

## 2021-04-23 DIAGNOSIS — I10 ESSENTIAL HYPERTENSION: ICD-10-CM

## 2021-04-25 RX ORDER — LISINOPRIL 20 MG/1
TABLET ORAL
Qty: 90 TABLET | Refills: 1 | Status: SHIPPED | OUTPATIENT
Start: 2021-04-25

## 2021-04-29 DIAGNOSIS — I10 ESSENTIAL HYPERTENSION: ICD-10-CM

## 2021-04-29 RX ORDER — CHLORTHALIDONE 50 MG/1
TABLET ORAL
Qty: 90 TABLET | Refills: 1 | Status: SHIPPED | OUTPATIENT
Start: 2021-04-29 | End: 2022-04-29

## 2021-06-18 RX ORDER — AMLODIPINE BESYLATE 10 MG/1
TABLET ORAL
Qty: 90 TABLET | Refills: 1 | Status: SHIPPED | OUTPATIENT
Start: 2021-06-18

## 2022-04-29 DIAGNOSIS — I10 ESSENTIAL HYPERTENSION: ICD-10-CM

## 2022-04-29 RX ORDER — CHLORTHALIDONE 50 MG/1
TABLET ORAL
Qty: 90 TABLET | Refills: 1 | Status: SHIPPED | OUTPATIENT
Start: 2022-04-29